# Patient Record
Sex: MALE | Race: BLACK OR AFRICAN AMERICAN | NOT HISPANIC OR LATINO | URBAN - METROPOLITAN AREA
[De-identification: names, ages, dates, MRNs, and addresses within clinical notes are randomized per-mention and may not be internally consistent; named-entity substitution may affect disease eponyms.]

---

## 2017-11-22 ENCOUNTER — TELEPHONE (OUTPATIENT)
Dept: INTERNAL MEDICINE | Facility: CLINIC | Age: 27
End: 2017-11-22

## 2017-11-22 NOTE — TELEPHONE ENCOUNTER
----- Message from Crystal Hannon sent at 11/22/2017  2:25 PM CST -----  Contact: patient  Returning your call. Please call patient @ [phone]. Thanks, rishi